# Patient Record
Sex: FEMALE | Race: BLACK OR AFRICAN AMERICAN | NOT HISPANIC OR LATINO | URBAN - METROPOLITAN AREA
[De-identification: names, ages, dates, MRNs, and addresses within clinical notes are randomized per-mention and may not be internally consistent; named-entity substitution may affect disease eponyms.]

---

## 2021-12-23 ENCOUNTER — EMERGENCY (EMERGENCY)
Facility: HOSPITAL | Age: 31
LOS: 1 days | Discharge: HOME | End: 2021-12-23
Attending: EMERGENCY MEDICINE
Payer: MEDICARE

## 2021-12-23 VITALS
OXYGEN SATURATION: 99 % | DIASTOLIC BLOOD PRESSURE: 101 MMHG | SYSTOLIC BLOOD PRESSURE: 153 MMHG | HEART RATE: 127 BPM | RESPIRATION RATE: 16 BRPM | TEMPERATURE: 98 F

## 2021-12-23 PROCEDURE — U0003: CPT

## 2021-12-23 PROCEDURE — U0005: CPT

## 2021-12-23 PROCEDURE — 99285 EMERGENCY DEPT VISIT HI MDM: CPT

## 2021-12-23 PROCEDURE — 93010 ELECTROCARDIOGRAM REPORT: CPT

## 2021-12-23 PROCEDURE — 99283 EMERGENCY DEPT VISIT LOW MDM: CPT | Mod: 25

## 2021-12-23 PROCEDURE — 93005 ELECTROCARDIOGRAM TRACING: CPT

## 2021-12-23 RX ORDER — SODIUM CHLORIDE 9 MG/ML
1000 INJECTION INTRAMUSCULAR; INTRAVENOUS; SUBCUTANEOUS ONCE
Refills: 0 | Status: DISCONTINUED | OUTPATIENT
Start: 2021-12-23 | End: 2021-12-23

## 2021-12-23 NOTE — ED STATDOCS - CLINICAL SUMMARY MEDICAL DECISION MAKING FREE TEXT BOX
Pt with mild cough, sore throat. Here for covid swab as she is travelling to [facility] tomorrow. Pt has no complaints. No chest pain, no shortness of breath. Was told her heart rate was high but not sure how high. Will check basic labs, D-dimer, hydration, reassess.

## 2021-12-23 NOTE — ED STATDOCS - OBJECTIVE STATEMENT
31y female with no significant PMHx presents to the ED requesting medical evaluation. Pt was here for a covid swab as she is travelling tomorrow to [facility], but had a high heart rate so was sent to the ED. Pt admits to having a slight productive cough and sore throat onset x2 days. Pt admits to not being hydrated the past few days and not being used to the weather, as she was standing outside to get covid tested today and yesterday. Pt has been covid vaccinated as of Nov 2021.     Denies f/c/n/v/cp/sob/palpitations/rash/headache/dizziness/abd.pain/d/c/dysuria/hematuria. Denies ever having covid. Denies hx of high heart rate.  [id number] for swab. 31y female with no significant PMHx presents to the ED requesting medical evaluation. Pt was here for a covid swab as she is travelling tomorrow to [facility]. Pt admits to having a slight productive cough and sore throat onset x2 days. Pt admits to not being hydrated the past few days and not being used to the weather, as she was standing outside. Pt has been covid vaccinated as of Nov 2021.  no hx of dvt/pe    Denies f/c/n/v/cp/sob/palpitations/rash/headache/dizziness/abd.pain/d/c/dysuria/hematuria. Denies ever having covid. Denies hx of high heart rate.  [id number] for swab.

## 2021-12-23 NOTE — ED STATDOCS - PATIENT PORTAL LINK FT
You can access the FollowMyHealth Patient Portal offered by [facility] by registering at the following website: [email or web address]. By joining [facility]’s FollowMyHealth portal, you will also be able to view your health information using other applications (apps) compatible with our system.

## 2021-12-23 NOTE — ED STATDOCS - PROGRESS NOTE DETAILS
Pt no longer wants to stay, as she is pressed for time since she has a flight tomorrow to [facility]. Pt understands her heart rate is high and doesn't want treatment/testing. Pt no longer wants to stay, as she is pressed for time since she has a flight tomorrow to [facility]. Pt understands her heart rate is high and doesn't want treatment/testing.  The patient is AAOx3, not intoxicated, and displays normal decision making ability. We discussed all risks, benefits, and alternatives to the progression of treatment and the potential dangers of leaving including but not limited to permanent disability, injury, and death.  The patient was instructed that they are welcome to change their decision to leave against medical advice and return to the emergency department at any time and for any reason in order to allow us to render care.

## 2021-12-24 LAB — SARS-COV-2 RNA SPEC QL NAA+PROBE: DETECTED
